# Patient Record
Sex: FEMALE | Race: WHITE | Employment: UNEMPLOYED | URBAN - METROPOLITAN AREA
[De-identification: names, ages, dates, MRNs, and addresses within clinical notes are randomized per-mention and may not be internally consistent; named-entity substitution may affect disease eponyms.]

---

## 2017-03-20 ENCOUNTER — OFFICE VISIT (OUTPATIENT)
Dept: GYNECOLOGY | Age: 52
End: 2017-03-20

## 2017-03-20 VITALS
BODY MASS INDEX: 26.53 KG/M2 | SYSTOLIC BLOOD PRESSURE: 122 MMHG | WEIGHT: 169 LBS | HEIGHT: 67 IN | DIASTOLIC BLOOD PRESSURE: 78 MMHG | TEMPERATURE: 98.5 F | HEART RATE: 77 BPM | RESPIRATION RATE: 17 BRPM

## 2017-03-20 DIAGNOSIS — N95.1 PERIMENOPAUSE: ICD-10-CM

## 2017-03-20 DIAGNOSIS — R10.2 PELVIC PAIN IN FEMALE: ICD-10-CM

## 2017-03-20 DIAGNOSIS — M54.5 LOW BACK PAIN, UNSPECIFIED BACK PAIN LATERALITY, UNSPECIFIED CHRONICITY, WITH SCIATICA PRESENCE UNSPECIFIED: ICD-10-CM

## 2017-03-20 DIAGNOSIS — Z01.419 WELL WOMAN EXAM WITH ROUTINE GYNECOLOGICAL EXAM: Primary | ICD-10-CM

## 2017-03-20 PROCEDURE — 99396 PREV VISIT EST AGE 40-64: CPT | Performed by: OBSTETRICS & GYNECOLOGY

## 2017-03-20 RX ORDER — ESTRADIOL 1 MG/1
1 TABLET ORAL DAILY
Qty: 30 TABLET | Refills: 3 | Status: SHIPPED | OUTPATIENT
Start: 2017-03-20

## 2017-03-20 RX ORDER — MONTELUKAST SODIUM 10 MG/1
TABLET ORAL
COMMUNITY
Start: 2017-02-22

## 2017-03-20 RX ORDER — FLUTICASONE FUROATE AND VILANTEROL TRIFENATATE 100; 25 UG/1; UG/1
POWDER RESPIRATORY (INHALATION)
COMMUNITY
Start: 2017-02-22

## 2017-03-22 ASSESSMENT — ENCOUNTER SYMPTOMS
BACK PAIN: 1
GASTROINTESTINAL NEGATIVE: 1
RESPIRATORY NEGATIVE: 1
EYES NEGATIVE: 1

## 2017-03-23 LAB
HPV COMMENT: ABNORMAL
HPV TYPE 16: NOT DETECTED
HPV TYPE 18: NOT DETECTED
HPVOH (OTHER TYPES): DETECTED
URINE CULTURE, ROUTINE: NORMAL

## 2017-03-23 RX ORDER — NITROFURANTOIN 25; 75 MG/1; MG/1
100 CAPSULE ORAL 2 TIMES DAILY
Qty: 14 CAPSULE | Refills: 0 | OUTPATIENT
Start: 2017-03-23 | End: 2017-03-30

## 2017-03-29 ENCOUNTER — HOSPITAL ENCOUNTER (OUTPATIENT)
Dept: ULTRASOUND IMAGING | Age: 52
Discharge: HOME | End: 2017-03-29
Attending: OBSTETRICS & GYNECOLOGY | Admitting: OBSTETRICS & GYNECOLOGY

## 2017-03-29 DIAGNOSIS — R10.2 PELVIC PAIN IN FEMALE: ICD-10-CM

## 2017-03-29 DIAGNOSIS — C34.11 MALIGNANT NEOPLASM OF UPPER LOBE, RIGHT BRONCHUS OR LUNG (HCC): ICD-10-CM

## 2017-03-30 DIAGNOSIS — Z09 FOLLOW-UP EXAM, 3-6 MONTHS SINCE PREVIOUS EXAM: Primary | ICD-10-CM

## 2017-03-30 DIAGNOSIS — D25.9 UTERINE LEIOMYOMA, UNSPECIFIED LOCATION: ICD-10-CM

## 2017-04-10 ENCOUNTER — PROCEDURE VISIT (OUTPATIENT)
Dept: GYNECOLOGY | Age: 52
End: 2017-04-10

## 2017-04-10 VITALS
RESPIRATION RATE: 17 BRPM | WEIGHT: 175 LBS | HEART RATE: 68 BPM | HEIGHT: 67 IN | BODY MASS INDEX: 27.47 KG/M2 | SYSTOLIC BLOOD PRESSURE: 103 MMHG | TEMPERATURE: 97.8 F | DIASTOLIC BLOOD PRESSURE: 66 MMHG

## 2017-04-10 DIAGNOSIS — R87.810 CERVICAL HIGH RISK HPV (HUMAN PAPILLOMAVIRUS) TEST POSITIVE: ICD-10-CM

## 2017-04-10 DIAGNOSIS — R87.612 LOW GRADE SQUAMOUS INTRAEPITH LESION ON CYTOLOGIC SMEAR CERVIX (LGSIL): Primary | ICD-10-CM

## 2017-04-10 PROCEDURE — 57454 BX/CURETT OF CERVIX W/SCOPE: CPT | Performed by: OBSTETRICS & GYNECOLOGY

## 2017-04-13 ENCOUNTER — TELEPHONE (OUTPATIENT)
Dept: GYNECOLOGY | Age: 52
End: 2017-04-13

## 2018-01-29 ENCOUNTER — HOSPITAL ENCOUNTER (OUTPATIENT)
Dept: MAMMOGRAPHY | Age: 53
Discharge: HOME | End: 2018-01-29
Attending: FAMILY MEDICINE | Admitting: FAMILY MEDICINE

## 2018-01-29 ENCOUNTER — OFFICE VISIT (OUTPATIENT)
Dept: GYNECOLOGY | Age: 53
End: 2018-01-29

## 2018-01-29 VITALS
BODY MASS INDEX: 28.78 KG/M2 | WEIGHT: 183.38 LBS | RESPIRATION RATE: 17 BRPM | TEMPERATURE: 98.4 F | HEIGHT: 67 IN | DIASTOLIC BLOOD PRESSURE: 75 MMHG | SYSTOLIC BLOOD PRESSURE: 112 MMHG | HEART RATE: 74 BPM

## 2018-01-29 DIAGNOSIS — D25.0 INTRAMURAL AND SUBMUCOUS LEIOMYOMA OF UTERUS: ICD-10-CM

## 2018-01-29 DIAGNOSIS — Z12.31 SCREENING MAMMOGRAM, ENCOUNTER FOR: ICD-10-CM

## 2018-01-29 DIAGNOSIS — N92.6 IRREGULAR MENSES: ICD-10-CM

## 2018-01-29 DIAGNOSIS — R87.612 LOW GRADE SQUAMOUS INTRAEPITH LESION ON CYTOLOGIC SMEAR CERVIX (LGSIL): ICD-10-CM

## 2018-01-29 DIAGNOSIS — D25.1 INTRAMURAL AND SUBMUCOUS LEIOMYOMA OF UTERUS: ICD-10-CM

## 2018-01-29 DIAGNOSIS — Z98.890 STATUS POST COLPOSCOPY: Primary | ICD-10-CM

## 2018-01-29 LAB
ESTRADIOL LEVEL: <5 PG/ML
FOLLICLE STIMULATING HORMONE: 83.1 MIU/ML

## 2018-01-29 PROCEDURE — 1036F TOBACCO NON-USER: CPT | Performed by: OBSTETRICS & GYNECOLOGY

## 2018-01-29 PROCEDURE — G8484 FLU IMMUNIZE NO ADMIN: HCPCS | Performed by: OBSTETRICS & GYNECOLOGY

## 2018-01-29 PROCEDURE — G8419 CALC BMI OUT NRM PARAM NOF/U: HCPCS | Performed by: OBSTETRICS & GYNECOLOGY

## 2018-01-29 PROCEDURE — 99213 OFFICE O/P EST LOW 20 MIN: CPT | Performed by: OBSTETRICS & GYNECOLOGY

## 2018-01-29 PROCEDURE — 3014F SCREEN MAMMO DOC REV: CPT | Performed by: OBSTETRICS & GYNECOLOGY

## 2018-01-29 PROCEDURE — 3017F COLORECTAL CA SCREEN DOC REV: CPT | Performed by: OBSTETRICS & GYNECOLOGY

## 2018-01-29 PROCEDURE — G8427 DOCREV CUR MEDS BY ELIG CLIN: HCPCS | Performed by: OBSTETRICS & GYNECOLOGY

## 2018-01-30 NOTE — PROGRESS NOTES
Never Smoker    Smokeless tobacco: Never Used    Alcohol use 0.0 oz/week      Comment: rare    Drug use: No    Sexual activity: Yes     Partners: Male      Comment: M + 3 [id number] y girl 16 & 14 yo     Other Topics Concern    Not on file     Social History Narrative    No narrative on file     Allergies   Allergen Reactions    Codeine     Vicodin [Hydrocodone-Acetaminophen] Rash     No outpatient prescriptions have been marked as taking for the 1/29/18 encounter (Office Visit) with Ria Mendiola MD.     Family History   Problem Relation Age of Onset    Diabetes Mother     High Blood Pressure Mother     Arthritis Mother     Heart Disease Mother     Diabetes Father     Diabetes Sister     Rheum Arthritis Neg Hx     Osteoarthritis Neg Hx     Asthma Neg Hx     Breast Cancer Neg Hx     Cancer Neg Hx     Heart Failure Neg Hx     High Cholesterol Neg Hx     Hypertension Neg Hx     Migraines Neg Hx     Ovarian Cancer Neg Hx     Rashes/Skin Problems Neg Hx     Seizures Neg Hx     Stroke Neg Hx     Thyroid Disease Neg Hx      /75 (Site: Right Arm, Position: Sitting, Cuff Size: Medium Adult)   Pulse 74   Temp 98.4 °F (36.9 °C) (Oral)   Resp 17   Ht 5' 7\" (1.702 m)   Wt 183 lb 6 oz (83.2 kg)   LMP 01/05/2016   Breastfeeding? No   BMI 28.72 kg/m²     WDWN in NAD  A and O x 3  ABD-soft, NT, ND, no hsm  PV-nl efg, Normal urethral meatus, nl urethra, nl bladder. , nl cervix, nl vagina, nl uterus with no masses, NT. Nl adnexa with no masses, NT.    Plan-lgsil-for repeat pap  -irregular bleeding  -cramping-for repeat pelvic US  -perimenopause/menopause. Her ED MD states that she may be candidate for HRT. Plans after results back.

## 2018-02-01 LAB
HPV COMMENT: NORMAL
HPV TYPE 16: NOT DETECTED
HPV TYPE 18: NOT DETECTED
HPVOH (OTHER TYPES): NOT DETECTED

## 2018-02-02 ENCOUNTER — HOSPITAL ENCOUNTER (OUTPATIENT)
Dept: ULTRASOUND IMAGING | Age: 53
Discharge: HOME | End: 2018-02-02
Attending: OBSTETRICS & GYNECOLOGY | Admitting: OBSTETRICS & GYNECOLOGY

## 2018-02-02 DIAGNOSIS — R10.2 PELVIC AND PERINEAL PAIN: ICD-10-CM

## 2018-02-02 DIAGNOSIS — D25.1 INTRAMURAL AND SUBMUCOUS LEIOMYOMA OF UTERUS: ICD-10-CM

## 2018-02-02 DIAGNOSIS — D25.0 INTRAMURAL AND SUBMUCOUS LEIOMYOMA OF UTERUS: ICD-10-CM

## 2022-02-08 ENCOUNTER — APPOINTMENT (RX ONLY)
Dept: URBAN - METROPOLITAN AREA CLINIC 174 | Facility: CLINIC | Age: 57
Setting detail: DERMATOLOGY
End: 2022-02-08

## 2022-02-08 DIAGNOSIS — L72.0 EPIDERMAL CYST: ICD-10-CM | Status: INADEQUATELY CONTROLLED

## 2022-02-08 DIAGNOSIS — H01.13 ECZEMATOUS DERMATITIS OF EYELID: ICD-10-CM | Status: INADEQUATELY CONTROLLED

## 2022-02-08 PROBLEM — H01.134 ECZEMATOUS DERMATITIS OF LEFT UPPER EYELID: Status: ACTIVE | Noted: 2022-02-08

## 2022-02-08 PROBLEM — H01.131 ECZEMATOUS DERMATITIS OF RIGHT UPPER EYELID: Status: ACTIVE | Noted: 2022-02-08

## 2022-02-08 PROCEDURE — ? ADDITIONAL NOTES

## 2022-02-08 PROCEDURE — 99203 OFFICE O/P NEW LOW 30 MIN: CPT

## 2022-02-08 PROCEDURE — ? PHOTO-DOCUMENTATION

## 2022-02-08 PROCEDURE — ? PRESCRIPTION MEDICATION MANAGEMENT

## 2022-02-08 PROCEDURE — ? PRESCRIPTION

## 2022-02-08 PROCEDURE — ? COUNSELING

## 2022-02-08 RX ORDER — PIMECROLIMUS 10 MG/G
CREAM TOPICAL
Qty: 30 | Refills: 2 | Status: ERX | COMMUNITY
Start: 2022-02-08

## 2022-02-08 RX ORDER — MINOCYCLINE HYDROCHLORIDE 100 MG/1
TABLET ORAL
Qty: 42 | Refills: 0 | Status: ERX | COMMUNITY
Start: 2022-02-08

## 2022-02-08 RX ORDER — HYDROCORTISONE 25 MG/G
CREAM TOPICAL
Qty: 30 | Refills: 3 | Status: ERX | COMMUNITY
Start: 2022-02-08

## 2022-02-08 RX ADMIN — MINOCYCLINE HYDROCHLORIDE: 100 TABLET ORAL at 00:00

## 2022-02-08 RX ADMIN — HYDROCORTISONE: 25 CREAM TOPICAL at 00:00

## 2022-02-08 RX ADMIN — PIMECROLIMUS: 10 CREAM TOPICAL at 00:00

## 2022-02-08 ASSESSMENT — LOCATION ZONE DERM
LOCATION ZONE: TRUNK
LOCATION ZONE: EYELID

## 2022-02-08 ASSESSMENT — SEVERITY ASSESSMENT: SEVERITY: MILD TO MODERATE

## 2022-02-08 ASSESSMENT — LOCATION SIMPLE DESCRIPTION DERM
LOCATION SIMPLE: CHEST
LOCATION SIMPLE: RIGHT SUPERIOR EYELID
LOCATION SIMPLE: LEFT SUPERIOR EYELID

## 2022-02-08 ASSESSMENT — LOCATION DETAILED DESCRIPTION DERM
LOCATION DETAILED: RIGHT MEDIAL SUPERIOR EYELID
LOCATION DETAILED: LEFT LATERAL SUPERIOR EYELID
LOCATION DETAILED: LEFT LATERAL SUPERIOR CHEST

## 2022-02-08 ASSESSMENT — PAIN INTENSITY VAS: HOW INTENSE IS YOUR PAIN 0 BEING NO PAIN, 10 BEING THE MOST SEVERE PAIN POSSIBLE?: NO PAIN

## 2022-02-08 NOTE — PROCEDURE: MIPS QUALITY
Quality 431: Preventive Care And Screening: Unhealthy Alcohol Use - Screening: Documentation of medical reason(s) for not screening for unhealthy alcohol use (e.g., limited life expectancy, other medical reasons)
Quality 110: Preventive Care And Screening: Influenza Immunization: Influenza Immunization Administered during Influenza season
Detail Level: Zone
Quality 111:Pneumonia Vaccination Status For Older Adults: Pneumococcal Vaccination Previously Received
Quality 130: Documentation Of Current Medications In The Medical Record: Current Medications Documented
Quality 226: Preventive Care And Screening: Tobacco Use: Screening And Cessation Intervention: Patient screened for tobacco use and is an ex/non-smoker

## 2022-02-08 NOTE — PROCEDURE: ADDITIONAL NOTES
Detail Level: Zone
Additional Notes: Approximate size of cyst is 2 cm x 2 cm
Render Risk Assessment In Note?: no

## 2022-02-08 NOTE — HPI: EVALUATION OF SKIN LESION(S)
What Type Of Note Output Would You Prefer (Optional)?: Bullet Format
Hpi Title: Evaluation of Skin Lesions
How Severe Are Your Spot(S)?: mild
Have Your Spot(S) Been Treated In The Past?: has not been treated
Additional History: Spot on chest that has been there for years, past weekend it started to hurt and itch. It is raised.\\n\\n

## 2022-02-08 NOTE — PROCEDURE: PRESCRIPTION MEDICATION MANAGEMENT
Render In Strict Bullet Format?: No
Plan: Warm compresses. Will recheck in 2 weeks. Not ready for I&D during today’s visit.
Detail Level: Zone
Initiate Treatment: Minocycline 100mg BID x 2 weeks ( pt aware of interaction with medication instructed to take Minocycline medication 3 hours after her other multi-vitamin )
Initiate Treatment: Pimecrolimus cream BID, hydrocortisone cream BID
Samples Given: Otc Claritin BID